# Patient Record
Sex: FEMALE | Race: WHITE | NOT HISPANIC OR LATINO | Employment: UNEMPLOYED | URBAN - METROPOLITAN AREA
[De-identification: names, ages, dates, MRNs, and addresses within clinical notes are randomized per-mention and may not be internally consistent; named-entity substitution may affect disease eponyms.]

---

## 2025-03-19 ENCOUNTER — OFFICE VISIT (OUTPATIENT)
Dept: URGENT CARE | Facility: CLINIC | Age: 30
End: 2025-03-19
Payer: COMMERCIAL

## 2025-03-19 VITALS
HEIGHT: 62 IN | TEMPERATURE: 98.2 F | BODY MASS INDEX: 19.32 KG/M2 | SYSTOLIC BLOOD PRESSURE: 125 MMHG | OXYGEN SATURATION: 97 % | HEART RATE: 103 BPM | DIASTOLIC BLOOD PRESSURE: 84 MMHG | RESPIRATION RATE: 16 BRPM | WEIGHT: 105 LBS

## 2025-03-19 DIAGNOSIS — K52.9 GASTROENTERITIS: Primary | ICD-10-CM

## 2025-03-19 PROCEDURE — 99213 OFFICE O/P EST LOW 20 MIN: CPT | Performed by: NURSE PRACTITIONER

## 2025-03-19 RX ORDER — ONDANSETRON 4 MG/1
4 TABLET, ORALLY DISINTEGRATING ORAL EVERY 8 HOURS PRN
Qty: 20 TABLET | Refills: 0 | Status: SHIPPED | OUTPATIENT
Start: 2025-03-19 | End: 2025-03-26

## 2025-03-19 NOTE — LETTER
March 19, 2025     Patient: Yara Burr   YOB: 1995   Date of Visit: 3/19/2025       To Whom it May Concern:    Yara Burr was seen in my clinic on 3/19/2025. She may return to school on 3/21/2025 .   May return to class 3/20/2025 if symptoms improve.     If you have any questions or concerns, please don't hesitate to call.         Sincerely,          Latonya Fitzpatrick, WALDO-CNP        CC: No Recipients   PHYSICAL EXAM:    GENERAL: Comfortable, no acute distress   HEAD:  Normocephalic, atraumatic  EYES: EOMI, PERRLA  HEENT: dry mucus membrane, somewhat decreased skin turgor  NECK: Supple, No JVD  NERVOUS SYSTEM:  Alert & Oriented X3, Motor Strength 5/5 B/L upper and 4/5 in lower extremities, reflexes somewhat diminished b/l, otherwise non-focal  CHEST/LUNG: Clear to auscultation bilaterally  HEART: Regular rate and rhythm, no murmur   ABDOMEN: Soft, Nontender, Nondistended, Bowel sounds present  EXTREMITIES:   No clubbing, cyanosis, or edema  MUSCULOSKELTAL- No muscle tenderness, no joint tenderness  SKIN- warm and dry, no rash

## 2025-03-19 NOTE — PROGRESS NOTES
[facility] URGENT CARE   GAVI NOTE:      Name: Yara Burr, 30 y.o.    CSN:[id number]   MRN:[id number]    PCP: No Assigned PCP Generic Provider, MD    ALL:  No Known Allergies    History:    Chief Complaint: Nausea (Pt states possible food poisoning, she missed a lecture at school today and needs and excuse, she is having nausea and diarrhea.)    Encounter Date: 3/19/2025      HPI: The history was obtained from the patient. Yara is a 30 y.o. female, who presents with a chief complaint of Nausea (Pt states possible food poisoning, she missed a lecture at school today and needs and excuse, she is having nausea and diarrhea.) Nausea that began 12 hours ago. The nausea is described as [moderate and is associated with such as vomiting, abdominal pain, or diarrhea. Abdominal pain improves with defecation. The patient reports that the nausea is intermittent and wax and wanes.  No history of recent travel, medication use, alcohol consumption, or dietary changes.  with similar symptoms after eating silvia pizza. The nausea has improved since onset.The patient denies any recent trauma, fever, or significant weight loss.The patient has tried imodium, but has not experienced significant relief.     PMHx:    No past medical history on file.           Current Outpatient Medications   Medication Sig Dispense Refill    ondansetron ODT (Zofran-ODT) 4 mg disintegrating tablet Dissolve 1 tablet (4 mg) in the mouth every 8 hours if needed for nausea or vomiting for up to 7 days. 20 tablet 0     No current facility-administered medications for this visit.         PMSx:  No past surgical history on file.    Fam Hx: No family history on file.    SOC. Hx:     Social History     Socioeconomic History    Marital status:      Spouse name: Not on file    Number of children: Not on file    Years of education: Not on file    Highest education level: Not on file   Occupational History    Not on file    Tobacco Use    Smoking status: Not on file    Smokeless tobacco: Not on file   Substance and Sexual Activity    Alcohol use: Not on file    Drug use: Not on file    Sexual activity: Not on file   Other Topics Concern    Not on file   Social History Narrative    Not on file     Social Drivers of Health     Financial Resource Strain: Not on file   Food Insecurity: Not on file   Transportation Needs: Not on file   Physical Activity: Not on file   Stress: Not on file   Social Connections: Not on file   Intimate Partner Violence: Not on file   Housing Stability: Not on file         Vitals:    03/19/25 1327   BP: 125/84   Pulse: 103   Resp: 16   Temp: 36.8 °C (98.2 °F)   SpO2: 97%     47.6 kg (105 lb)          Physical Exam  Vitals and nursing note reviewed.   Constitutional:       Appearance: Normal appearance.   HENT:      Head: Normocephalic and atraumatic.      Mouth/Throat:      Mouth: Mucous membranes are moist.      Pharynx: Oropharynx is clear.   Eyes:      Pupils: Pupils are equal, round, and reactive to light.   Cardiovascular:      Rate and Rhythm: Normal rate and regular rhythm.      Pulses: Normal pulses.      Heart sounds: Normal heart sounds.   Pulmonary:      Effort: Pulmonary effort is normal.      Breath sounds: Normal breath sounds.   Abdominal:      General: Abdomen is flat. Bowel sounds are increased.      Palpations: Abdomen is soft.      Tenderness: There is generalized abdominal tenderness. There is no guarding or rebound. Negative signs include Frank's sign, Rovsing's sign, McBurney's sign, psoas sign and obturator sign.   Musculoskeletal:         General: Normal range of motion.      Cervical back: Normal range of motion.   Skin:     General: Skin is warm and dry.      Capillary Refill: Capillary refill takes less than 2 seconds.   Neurological:      Mental Status: She is alert and oriented to person, place, and time.        ____________________________________________________________________    I did personally review Yara's past medical history, surgical history, social history, as well as family history (when relevant).  In this case, I also oversaw the her drug management by reviewing her medication list, allergy list, as well as the medications that I prescribed during the UC course and/or recommended as an out-patient (including possible OTC medications such as acetaminophen, NSAIDs , etc).    After reviewing the items above, I did look at previous medical documentation, such as recent hospitalizations, office visits, and/or recent consultations with PCP/specialist.                          SDOH:   Another factor that I considered in Yara's care was her Social Determinants of Health (SDOH). During this UC encounter, she did not have social determinants of health. Those SDOH influencing Yara's care are: none      _____________________________________________________________________      UC COURSE/MEDICAL DECISION MAKING:    Yara is a 30 y.o., who presents with a working diagnosis of   1. Gastroenteritis        Yara was seen today for nausea.  Diagnoses and all orders for this visit:  Gastroenteritis (Primary)  -     ondansetron ODT (Zofran-ODT) 4 mg disintegrating tablet; Dissolve 1 tablet (4 mg) in the mouth every 8 hours if needed for nausea or vomiting for up to 7 days.     Reviewed S&S of dehydration. Advised to avoid diarrheal medications, given Zofran as needed for nausea.    Plan of care reviewed with patient.  If symptoms change and/or worsen will follow-up with primary care provider, return to urgent care, or go to the nearest emergency department for further evaluation.  Patient states understanding and is agreeable with plan of care.      WALDO Laguna, DNP   Advanced Practice Provider  [facility] URGENT CARE    Please note: While the patient may or may not have  received printed discharge paperwork, all relevant medical findings, test results, and treatment details are accessible through the electronic medical record system. The patient is encouraged to review their chart via the patient portal for comprehensive information and follow-up instructions.

## 2025-04-21 ENCOUNTER — OFFICE VISIT (OUTPATIENT)
Dept: URGENT CARE | Facility: CLINIC | Age: 30
End: 2025-04-21
Payer: COMMERCIAL

## 2025-04-21 VITALS
SYSTOLIC BLOOD PRESSURE: 100 MMHG | DIASTOLIC BLOOD PRESSURE: 80 MMHG | OXYGEN SATURATION: 98 % | TEMPERATURE: 98.6 F | RESPIRATION RATE: 14 BRPM | HEART RATE: 88 BPM

## 2025-04-21 DIAGNOSIS — R19.7 DIARRHEA, UNSPECIFIED TYPE: ICD-10-CM

## 2025-04-21 DIAGNOSIS — R11.10 VOMITING, UNSPECIFIED VOMITING TYPE, UNSPECIFIED WHETHER NAUSEA PRESENT: Primary | ICD-10-CM

## 2025-04-21 PROCEDURE — 99212 OFFICE O/P EST SF 10 MIN: CPT

## 2025-04-21 NOTE — PATIENT INSTRUCTIONS
Today you were seen for vomiting, abdominal pain and diarrhea.  Discussed ER versus outpatient follow-up.  You have ultimately decided that you do not wish to go to the ER today.  Discussed the risks of this.  Strongly recommend you orally rehydrate at home.  If you develop fevers, chills, persistent nausea, vomiting, abdominal pain, diarrhea or any other concerns you should report to the ER immediately.

## 2025-04-21 NOTE — LETTER
April 21, 2025     Patient: Yara Burr   YOB: 1995   Date of Visit: 4/21/2025       To Whom it May Concern:    Yara Burr was seen in my clinic on 4/21/2025. She may return to school on 4/22/25 .    If you have any questions or concerns, please don't hesitate to call.         Sincerely,          Mar Ghotra PA-C        CC: No Recipients

## 2025-04-21 NOTE — LETTER
April 21, 2025     Patient: Yara Burr   YOB: 1995   Date of Visit: 4/21/2025       To Whom It May Concern:     Yara Burr was seen at the urgent care on 4/21/2025.  Please excuse her absence from work yesterday,/20/25.  She may return to work on 4/22/2025.    If you have any questions or concerns, please don't hesitate to call.         Sincerely,        Mar Ghotra PA-C    CC: No Recipients

## 2025-04-21 NOTE — PROGRESS NOTES
[facility] URGENT CARE GAVI NOTE:      Name: Yara Burr, 30 y.o.    CSN:[id number]   MRN:[id number]    PCP: No Assigned PCP Generic Provider, MD    ALL:  Allergies[1]    History:    Chief Complaint: Vomiting, Abdominal Pain, and Diarrhea (X 5 days)    Encounter Date: 4/21/2025      HPI: The history was obtained from the patient. Yara is a 30 y.o. female, who presents with a chief complaint of Vomiting, Abdominal Pain, and Diarrhea (X 5 days).  Patient states she had nausea and vomiting for 3 days which is since resolved.  She is now having diarrhea for the last 2 days as well as abdominal cramping.  She states she has had this problem in the past.  She states she is able to tolerate fluids without difficulty.  She denies fevers, chills, chest pain, shortness of breath, urinary changes.    PMHx:    Medical History[2]         Current Medications[3]      PMSx:  Surgical History[4]    Fam Hx: Family History[5]    SOC. Hx:     Social History     Socioeconomic History    Marital status:      Spouse name: Not on file    Number of children: Not on file    Years of education: Not on file    Highest education level: Not on file   Occupational History    Not on file   Tobacco Use    Smoking status: Every Day     Current packs/day: 0.50     Types: Cigarettes    Smokeless tobacco: Never   Substance and Sexual Activity    Alcohol use: Not on file    Drug use: Not on file    Sexual activity: Not on file   Other Topics Concern    Not on file   Social History Narrative    Not on file     Social Drivers of Health     Financial Resource Strain: Not on file   Food Insecurity: Not on file   Transportation Needs: Not on file   Physical Activity: Not on file   Stress: Not on file   Social Connections: Not on file   Intimate Partner Violence: Not on file   Housing Stability: Not on file         Vitals:    04/21/25 1253   BP:    Pulse: 88   Resp:    Temp:    SpO2:                 Physical Exam  Vitals  reviewed.   Constitutional:       General: She is not in acute distress.     Appearance: Normal appearance. She is not ill-appearing, toxic-appearing or diaphoretic.   HENT:      Head: Normocephalic and atraumatic.      Right Ear: Tympanic membrane normal.      Left Ear: Tympanic membrane normal.      Nose: Nose normal.      Mouth/Throat:      Mouth: Mucous membranes are moist.      Pharynx: Oropharynx is clear. No oropharyngeal exudate or posterior oropharyngeal erythema.   Eyes:      Extraocular Movements: Extraocular movements intact.      Conjunctiva/sclera: Conjunctivae normal.      Pupils: Pupils are equal, round, and reactive to light.   Cardiovascular:      Rate and Rhythm: Normal rate and regular rhythm.      Pulses: Normal pulses.           Radial pulses are 2+ on the right side and 2+ on the left side.      Heart sounds: Normal heart sounds.      Comments: Initially mildly tachycardic resolved without any intervention.  Pulmonary:      Effort: Pulmonary effort is normal. No respiratory distress.      Breath sounds: Normal breath sounds. No stridor. No wheezing, rhonchi or rales.   Abdominal:      General: Abdomen is flat. Bowel sounds are normal. There is no distension.      Palpations: Abdomen is soft.      Tenderness: There is no abdominal tenderness. There is no guarding or rebound.   Musculoskeletal:      Cervical back: Normal range of motion and neck supple. No rigidity or tenderness.   Lymphadenopathy:      Cervical: No cervical adenopathy.   Skin:     General: Skin is warm.      Capillary Refill: Capillary refill takes less than 2 seconds.      Coloration: Skin is not pale.      Findings: No erythema or rash.   Neurological:      General: No focal deficit present.      Mental Status: She is alert and oriented to person, place, and time.   Psychiatric:         Mood and Affect: Mood normal.         Behavior: Behavior normal.       I did personally review Yara's past medical history, surgical  history, social history, as well as family history (when relevant).  In this case, I also oversaw the her drug management by reviewing her medication list, allergy list, as well as the medications that I prescribed during the UC course and/or recommended as an out-patient (including possible OTC medications such as acetaminophen, NSAIDs , etc).    After reviewing the items above, I did look at previous medical documentation, such as recent hospitalizations, office visits, and/or recent consultations with PCP/specialist.                          SDOH:   Another factor that I considered in Yara's care was her Social Determinants of Health (SDOH). During this UC encounter, she did not have social determinants of health. Those SDOH influencing Yara's care are: none    LABORATORY @ RADIOLOGICAL IMAGING (if done):     No results found for this or any previous visit (from the past 24 hours).    UC COURSE/MEDICAL DECISION MAKING:    Yara is a 30 y.o., who presents with a working diagnosis of   1. Vomiting, unspecified vomiting type, unspecified whether nausea present    2. Diarrhea, unspecified type      Yara was seen today for vomiting, abdominal pain and diarrhea.  Diagnoses and all orders for this visit:  Vomiting, unspecified vomiting type, unspecified whether nausea present (Primary)  Diarrhea, unspecified type    Patient presented to the urgent care today for vomiting, diarrhea and abdominal cramping.  Patient's BP was taken manually which was 100/80.  Patient initially mildly tachycardic which upon repeat was within normal limits.  Abdomen is nontender nondistended without rebound or guarding.  Mucous membranes are moist.  Peripheral pulses are 2+ bilateral and equal.  Lung sounds are clear without wheezing.  I do recommend patient go to the ER for follow-up and possible IV fluids if deemed necessary.  Patient declined at this time.  She states she does not wish to go to the ER.  I did discuss the risks  "of not doing so.  Patient verbalized her understanding of these risks.  She would like to orally rehydrate at home.  She states she will go to the ER if she is unable to tolerate fluids or anything worsens.  Recommend patient follow with primary care provider within 48 to 72 hours Fun[facility] visit.  Certainly if she develops fevers, chills, nausea, vomiting, lightheadedness/dizziness, inability tolerate p.o., chest pain, shortness of breath, abdominal pain, urinary or bowel changes she should report to the ER immediately.  Patient verbalized understanding was agreeable to this plan.    Cindy Ghotra PA-C   Advanced Practice Provider  [facility] URG[facility]    Please note: Portions of this chart may have been created with Dragon voice recognition software. Occasional wrong-word or \"sound-like\" substitutions may have occurred due to inherent limitations of the voice recognition software. Please excuse any typographical or grammatical errors contained herein. Please read the chart carefully and recognize, using context, where the substitutions have occurred.          [1] No Known Allergies  [2] No past medical history on file.  [3]   No current outpatient medications on file.     No current facility-administered medications for this visit.   [4] No past surgical history on file.  [5] No family history on file.    "